# Patient Record
Sex: FEMALE | Race: BLACK OR AFRICAN AMERICAN | NOT HISPANIC OR LATINO | Employment: OTHER | URBAN - METROPOLITAN AREA
[De-identification: names, ages, dates, MRNs, and addresses within clinical notes are randomized per-mention and may not be internally consistent; named-entity substitution may affect disease eponyms.]

---

## 2017-07-25 LAB
HCV AB SER QL: NEGATIVE
HEP B SURFACE AG: NEGATIVE
HEPATITIS A IGM: NEGATIVE
HEPATITIS B CORE AB IGM: NEGATIVE

## 2019-06-27 PROBLEM — G47.00 INSOMNIA: Status: ACTIVE | Noted: 2019-06-27

## 2019-06-27 PROBLEM — D64.9 ANEMIA: Status: ACTIVE | Noted: 2019-06-27

## 2019-06-28 PROBLEM — E83.42 HYPOMAGNESEMIA: Status: ACTIVE | Noted: 2019-06-28

## 2019-06-28 PROBLEM — E53.8 VITAMIN B12 DEFICIENCY: Status: ACTIVE | Noted: 2019-06-28

## 2019-07-18 PROBLEM — F41.9 ANXIETY: Status: ACTIVE | Noted: 2019-07-18

## 2019-07-31 PROBLEM — A59.9 TRICHIMONIASIS: Status: ACTIVE | Noted: 2019-07-31

## 2019-07-31 LAB — PAP RECOMMENDATION EXT: ABNORMAL

## 2019-08-02 LAB
HPV 16: NEGATIVE
HPV 18: NEGATIVE
HPV, HR, OTHER GENOTYPES: NEGATIVE

## 2019-09-04 PROBLEM — A18.32: Status: ACTIVE | Noted: 2019-09-04

## 2019-09-04 PROBLEM — N83.201 CYST OF RIGHT OVARY: Status: ACTIVE | Noted: 2019-09-04

## 2019-09-04 PROBLEM — R10.2 PELVIC PAIN IN FEMALE: Status: ACTIVE | Noted: 2019-09-04

## 2019-09-10 PROBLEM — N20.0 KIDNEY STONE: Status: ACTIVE | Noted: 2019-09-10

## 2019-10-06 PROBLEM — N20.0 CALCIUM NEPHROLITHIASIS: Status: ACTIVE | Noted: 2017-02-07

## 2019-10-06 PROBLEM — R51.9 HEADACHE: Status: ACTIVE | Noted: 2019-10-06

## 2019-10-06 PROBLEM — R19.7 DIARRHEA FOLLOWING GASTROINTESTINAL SURGERY: Status: ACTIVE | Noted: 2017-08-07

## 2019-10-06 PROBLEM — R10.31 RIGHT LOWER QUADRANT ABDOMINAL PAIN: Status: ACTIVE | Noted: 2017-12-21

## 2019-10-06 PROBLEM — Z83.79 FAMILY HISTORY OF CROHN'S DISEASE: Status: ACTIVE | Noted: 2017-12-21

## 2019-10-06 PROBLEM — K29.00 ACUTE SUPERFICIAL GASTRITIS WITHOUT HEMORRHAGE: Status: ACTIVE | Noted: 2019-10-06

## 2019-10-06 PROBLEM — G43.009 MIGRAINE WITHOUT AURA AND WITHOUT STATUS MIGRAINOSUS, NOT INTRACTABLE: Status: ACTIVE | Noted: 2017-06-22

## 2019-10-06 PROBLEM — J30.9 ALLERGIC RHINITIS: Status: ACTIVE | Noted: 2019-10-06

## 2019-10-06 PROBLEM — Z86.11 H/O TB (TUBERCULOSIS): Status: ACTIVE | Noted: 2017-12-21

## 2019-10-06 PROBLEM — R93.3: Status: ACTIVE | Noted: 2019-10-06

## 2019-10-06 PROBLEM — Z98.890 DIARRHEA FOLLOWING GASTROINTESTINAL SURGERY: Status: ACTIVE | Noted: 2017-08-07

## 2019-10-06 PROBLEM — J01.90 ACUTE SINUSITIS: Status: ACTIVE | Noted: 2019-10-06

## 2019-10-06 PROBLEM — R10.13 ABDOMINAL PAIN, EPIGASTRIC: Status: ACTIVE | Noted: 2019-10-06

## 2019-10-06 PROBLEM — R19.7 DIARRHEA: Status: ACTIVE | Noted: 2017-12-21

## 2019-11-19 PROBLEM — N12 PYELONEPHRITIS: Status: ACTIVE | Noted: 2019-11-19

## 2019-12-13 PROBLEM — R19.7 DIARRHEA FOLLOWING GASTROINTESTINAL SURGERY: Status: RESOLVED | Noted: 2017-08-07 | Resolved: 2019-12-13

## 2019-12-13 PROBLEM — Z86.11 H/O TB (TUBERCULOSIS): Status: RESOLVED | Noted: 2017-12-21 | Resolved: 2019-12-13

## 2019-12-13 PROBLEM — R51.9 HEADACHE: Status: RESOLVED | Noted: 2019-10-06 | Resolved: 2019-12-13

## 2019-12-13 PROBLEM — Z83.79 FAMILY HISTORY OF CROHN'S DISEASE: Status: RESOLVED | Noted: 2017-12-21 | Resolved: 2019-12-13

## 2019-12-13 PROBLEM — R93.3: Status: RESOLVED | Noted: 2019-10-06 | Resolved: 2019-12-13

## 2019-12-13 PROBLEM — A18.32: Status: RESOLVED | Noted: 2019-09-04 | Resolved: 2019-12-13

## 2019-12-13 PROBLEM — N12 PYELONEPHRITIS: Status: RESOLVED | Noted: 2019-11-19 | Resolved: 2019-12-13

## 2019-12-13 PROBLEM — E83.42 HYPOMAGNESEMIA: Status: RESOLVED | Noted: 2019-06-28 | Resolved: 2019-12-13

## 2019-12-13 PROBLEM — N20.0 CALCIUM NEPHROLITHIASIS: Status: RESOLVED | Noted: 2017-02-07 | Resolved: 2019-12-13

## 2019-12-13 PROBLEM — R10.13 ABDOMINAL PAIN, EPIGASTRIC: Status: RESOLVED | Noted: 2019-10-06 | Resolved: 2019-12-13

## 2019-12-13 PROBLEM — K29.00 ACUTE SUPERFICIAL GASTRITIS WITHOUT HEMORRHAGE: Status: RESOLVED | Noted: 2019-10-06 | Resolved: 2019-12-13

## 2019-12-13 PROBLEM — R10.31 RIGHT LOWER QUADRANT ABDOMINAL PAIN: Status: RESOLVED | Noted: 2017-12-21 | Resolved: 2019-12-13

## 2019-12-13 PROBLEM — G47.00 INSOMNIA: Chronic | Status: ACTIVE | Noted: 2019-06-27

## 2019-12-13 PROBLEM — Z98.890 DIARRHEA FOLLOWING GASTROINTESTINAL SURGERY: Status: RESOLVED | Noted: 2017-08-07 | Resolved: 2019-12-13

## 2019-12-13 PROBLEM — J01.90 ACUTE SINUSITIS: Status: RESOLVED | Noted: 2019-10-06 | Resolved: 2019-12-13

## 2019-12-13 PROBLEM — R19.7 DIARRHEA: Status: RESOLVED | Noted: 2017-12-21 | Resolved: 2019-12-13

## 2019-12-13 PROBLEM — A59.9 TRICHIMONIASIS: Status: RESOLVED | Noted: 2019-07-31 | Resolved: 2019-12-13

## 2019-12-13 PROBLEM — J30.9 ALLERGIC RHINITIS: Chronic | Status: ACTIVE | Noted: 2019-10-06

## 2020-03-02 PROBLEM — R11.0 CHRONIC NAUSEA: Chronic | Status: ACTIVE | Noted: 2020-03-02

## 2020-03-02 PROBLEM — I10 ESSENTIAL HYPERTENSION: Chronic | Status: ACTIVE | Noted: 2020-03-02

## 2020-03-02 PROBLEM — T78.40XA ALLERGIES: Status: ACTIVE | Noted: 2020-03-02

## 2020-03-02 PROBLEM — M62.838 MUSCLE SPASMS OF BOTH LOWER EXTREMITIES: Chronic | Status: ACTIVE | Noted: 2020-03-02

## 2020-03-02 PROBLEM — R14.0 ABDOMINAL BLOATING: Status: ACTIVE | Noted: 2020-03-02

## 2020-03-14 PROBLEM — M54.6 THORACIC SPINE PAIN: Status: ACTIVE | Noted: 2020-03-14

## 2020-03-14 PROBLEM — K58.9 IRRITABLE BOWEL SYNDROME: Status: ACTIVE | Noted: 2020-03-14

## 2020-03-14 PROBLEM — M25.50 ARTHRALGIA: Status: ACTIVE | Noted: 2020-03-14

## 2020-06-24 PROBLEM — Z30.09 UNWANTED FERTILITY: Status: ACTIVE | Noted: 2020-06-24

## 2020-07-14 PROBLEM — L98.9 SKIN LESION: Status: ACTIVE | Noted: 2020-07-14

## 2020-08-02 PROBLEM — Z00.00 HEALTHCARE MAINTENANCE: Status: ACTIVE | Noted: 2020-08-02

## 2020-08-02 PROBLEM — L30.9 ECZEMA: Status: ACTIVE | Noted: 2020-08-02

## 2020-10-01 PROBLEM — R10.9 RIGHT FLANK PAIN: Status: ACTIVE | Noted: 2020-10-01

## 2020-11-02 PROBLEM — Z00.00 HEALTHCARE MAINTENANCE: Status: RESOLVED | Noted: 2020-08-02 | Resolved: 2020-11-02

## 2020-12-15 PROBLEM — N28.1 RENAL CYST, LEFT: Status: ACTIVE | Noted: 2020-12-15

## 2021-03-15 PROBLEM — N10 PYELONEPHRITIS, ACUTE: Status: ACTIVE | Noted: 2021-03-15

## 2021-03-30 PROBLEM — N12 RECURRENT PYELONEPHRITIS: Status: ACTIVE | Noted: 2021-03-30

## 2021-05-12 ENCOUNTER — PATIENT MESSAGE (OUTPATIENT)
Dept: RESEARCH | Facility: HOSPITAL | Age: 31
End: 2021-05-12

## 2021-12-13 PROBLEM — R13.10 DYSPHAGIA: Status: ACTIVE | Noted: 2021-12-13

## 2021-12-13 PROBLEM — R63.4 UNINTENTIONAL WEIGHT LOSS: Status: ACTIVE | Noted: 2021-12-13

## 2021-12-13 PROBLEM — R11.2 NAUSEA AND VOMITING: Status: ACTIVE | Noted: 2020-03-02

## 2021-12-13 PROBLEM — R68.81 EARLY SATIETY: Status: ACTIVE | Noted: 2021-12-13

## 2021-12-13 PROBLEM — R63.0 LOSS OF APPETITE: Status: ACTIVE | Noted: 2021-12-13

## 2022-04-11 ENCOUNTER — TELEPHONE (OUTPATIENT)
Dept: PHARMACY | Facility: CLINIC | Age: 32
End: 2022-04-11
Payer: MEDICAID

## 2022-04-12 NOTE — TELEPHONE ENCOUNTER
Pharmacist Ms. Loyd,    Thank you for reaching out and assisting with the PA process. I purposely omitted the loading dose as the patient is returning today for an in-clinic nurse visit to receive the loading dose as a sample. I thought I could not order the loading dose until the day of visit, but I may have not understood that process.     Patient is to receive loading dose today if she arrives for her nurse visit. This order is now pended until patient arrival.    Please reach out with any further questions.    Courtney Vanni, FNP-C [facility]  [phone]

## 2022-04-13 ENCOUNTER — PATIENT MESSAGE (OUTPATIENT)
Dept: ADMINISTRATIVE | Facility: OTHER | Age: 32
End: 2022-04-13
Payer: MEDICAID

## 2022-04-18 ENCOUNTER — TELEPHONE (OUTPATIENT)
Dept: PHARMACY | Facility: CLINIC | Age: 32
End: 2022-04-18
Payer: MEDICAID

## 2022-05-25 ENCOUNTER — TELEPHONE (OUTPATIENT)
Dept: PHARMACY | Facility: CLINIC | Age: 32
End: 2022-05-25
Payer: MEDICAID

## 2022-10-10 ENCOUNTER — PATIENT OUTREACH (OUTPATIENT)
Dept: ADMINISTRATIVE | Facility: HOSPITAL | Age: 32
End: 2022-10-10
Payer: MEDICAID

## 2022-12-15 ENCOUNTER — TELEPHONE (OUTPATIENT)
Dept: ADMINISTRATIVE | Facility: HOSPITAL | Age: 32
End: 2022-12-15
Payer: MEDICAID

## 2022-12-15 VITALS — DIASTOLIC BLOOD PRESSURE: 68 MMHG | SYSTOLIC BLOOD PRESSURE: 105 MMHG

## 2023-04-11 PROBLEM — G43.709 CHRONIC MIGRAINE WITHOUT AURA WITHOUT STATUS MIGRAINOSUS, NOT INTRACTABLE: Status: ACTIVE | Noted: 2017-06-22

## 2023-04-17 ENCOUNTER — PATIENT MESSAGE (OUTPATIENT)
Dept: ADMINISTRATIVE | Facility: OTHER | Age: 33
End: 2023-04-17
Payer: MEDICAID

## 2023-06-08 ENCOUNTER — TELEPHONE (OUTPATIENT)
Dept: PHARMACY | Facility: CLINIC | Age: 33
End: 2023-06-08
Payer: MEDICAID

## 2023-06-08 NOTE — TELEPHONE ENCOUNTER
Called and inform pt that The prior authorization for Marcellus Stubbs's Omeprazole prescription has been APPROVED FROM 6/6/2023 TO 6/5/2024 with copayment of $0.00.

## 2024-04-28 PROBLEM — K80.50 COLIC, BILIARY: Status: ACTIVE | Noted: 2024-04-28

## 2024-04-28 PROBLEM — E87.6 HYPOKALEMIA: Status: ACTIVE | Noted: 2024-04-28

## 2024-04-28 PROBLEM — B95.61 STAPHYLOCOCCUS AUREUS BACTEREMIA: Status: ACTIVE | Noted: 2024-04-28

## 2024-04-28 PROBLEM — R78.81 STAPHYLOCOCCUS AUREUS BACTEREMIA: Status: ACTIVE | Noted: 2024-04-28

## 2024-04-28 PROBLEM — R00.0 TACHYCARDIA: Status: ACTIVE | Noted: 2024-04-28

## 2024-04-29 PROBLEM — B95.7 COAGULASE NEGATIVE STAPHYLOCOCCUS BACTEREMIA: Status: ACTIVE | Noted: 2024-04-28

## 2024-04-30 PROBLEM — R50.9 PERSISTENT FEVER: Status: ACTIVE | Noted: 2024-04-30

## 2024-05-05 PROBLEM — K83.8 DILATED BILE DUCT: Status: ACTIVE | Noted: 2024-05-05

## 2024-05-09 PROBLEM — D75.839 THROMBOCYTOSIS: Status: ACTIVE | Noted: 2024-05-09

## 2024-05-20 ENCOUNTER — PATIENT MESSAGE (OUTPATIENT)
Dept: ADMINISTRATIVE | Facility: HOSPITAL | Age: 34
End: 2024-05-20
Payer: MEDICAID

## 2024-07-29 PROBLEM — N12 PYELONEPHRITIS OF LEFT KIDNEY: Status: RESOLVED | Noted: 2021-03-30 | Resolved: 2024-07-29

## 2024-10-29 PROBLEM — M25.531 RIGHT WRIST PAIN: Status: ACTIVE | Noted: 2024-10-29

## 2024-11-18 ENCOUNTER — PATIENT MESSAGE (OUTPATIENT)
Dept: ADMINISTRATIVE | Facility: HOSPITAL | Age: 34
End: 2024-11-18
Payer: MEDICAID

## 2024-11-18 PROBLEM — K31.89 RETAINED FOOD IN STOMACH: Status: ACTIVE | Noted: 2024-11-18

## 2024-11-18 PROBLEM — K25.9 GASTRIC ULCER WITHOUT HEMORRHAGE OR PERFORATION: Status: ACTIVE | Noted: 2024-11-18

## 2025-01-27 PROBLEM — R20.2 RIGHT HAND PARESTHESIA: Status: ACTIVE | Noted: 2025-01-27

## 2025-02-11 PROBLEM — M25.50 ARTHRALGIA: Status: RESOLVED | Noted: 2020-03-14 | Resolved: 2025-02-11

## 2025-02-11 PROBLEM — D75.839 THROMBOCYTOSIS: Status: RESOLVED | Noted: 2024-05-09 | Resolved: 2025-02-11

## 2025-02-11 PROBLEM — M54.6 THORACIC SPINE PAIN: Status: RESOLVED | Noted: 2020-03-14 | Resolved: 2025-02-11

## 2025-02-11 PROBLEM — Z30.09 UNWANTED FERTILITY: Status: RESOLVED | Noted: 2020-06-24 | Resolved: 2025-02-11

## 2025-02-11 PROBLEM — L98.9 SKIN LESION: Status: RESOLVED | Noted: 2020-07-14 | Resolved: 2025-02-11

## 2025-02-11 PROBLEM — E83.42 HYPOMAGNESEMIA: Status: RESOLVED | Noted: 2019-06-28 | Resolved: 2025-02-11

## 2025-02-11 PROBLEM — D64.9 ANEMIA: Status: RESOLVED | Noted: 2019-06-27 | Resolved: 2025-02-11

## 2025-02-11 PROBLEM — R10.2 PELVIC PAIN IN FEMALE: Status: RESOLVED | Noted: 2019-09-04 | Resolved: 2025-02-11

## 2025-02-11 PROBLEM — K83.8 INTRAHEPATIC BILE DUCT DILATION: Status: RESOLVED | Noted: 2024-05-05 | Resolved: 2025-02-11

## 2025-02-11 PROBLEM — R63.0 LOSS OF APPETITE: Status: RESOLVED | Noted: 2021-12-13 | Resolved: 2025-02-11

## 2025-02-11 PROBLEM — R20.2 RIGHT HAND PARESTHESIA: Status: RESOLVED | Noted: 2025-01-27 | Resolved: 2025-02-11

## 2025-02-11 PROBLEM — N10 PYELONEPHRITIS, ACUTE: Status: RESOLVED | Noted: 2021-03-15 | Resolved: 2025-02-11

## 2025-02-11 PROBLEM — R93.1 ABNORMAL ECHOCARDIOGRAM: Status: ACTIVE | Noted: 2025-02-11

## 2025-02-11 PROBLEM — R11.2 NAUSEA AND VOMITING: Status: RESOLVED | Noted: 2020-03-02 | Resolved: 2025-02-11

## 2025-02-11 PROBLEM — R10.13 DYSPEPSIA: Status: RESOLVED | Noted: 2019-10-06 | Resolved: 2025-02-11

## 2025-02-11 PROBLEM — E87.6 HYPOKALEMIA: Status: RESOLVED | Noted: 2024-04-28 | Resolved: 2025-02-11

## 2025-02-11 PROBLEM — R00.0 TACHYCARDIA: Status: RESOLVED | Noted: 2024-04-28 | Resolved: 2025-02-11

## 2025-02-11 PROBLEM — M25.531 RIGHT WRIST PAIN: Status: RESOLVED | Noted: 2024-10-29 | Resolved: 2025-02-11

## 2025-02-11 PROBLEM — R78.81 COAGULASE NEGATIVE STAPHYLOCOCCUS BACTEREMIA: Status: RESOLVED | Noted: 2024-04-28 | Resolved: 2025-02-11

## 2025-02-11 PROBLEM — T78.40XA ALLERGIES: Status: RESOLVED | Noted: 2020-03-02 | Resolved: 2025-02-11

## 2025-02-11 PROBLEM — R10.9 ABDOMINAL PAIN: Status: RESOLVED | Noted: 2020-10-01 | Resolved: 2025-02-11

## 2025-02-11 PROBLEM — R14.0 ABDOMINAL BLOATING: Status: RESOLVED | Noted: 2020-03-02 | Resolved: 2025-02-11

## 2025-02-11 PROBLEM — B95.7 COAGULASE NEGATIVE STAPHYLOCOCCUS BACTEREMIA: Status: RESOLVED | Noted: 2024-04-28 | Resolved: 2025-02-11

## 2025-02-11 PROBLEM — K31.89 RETAINED FOOD IN STOMACH: Status: RESOLVED | Noted: 2024-11-18 | Resolved: 2025-02-11

## 2025-02-11 PROBLEM — M62.838 MUSCLE SPASMS OF BOTH LOWER EXTREMITIES: Chronic | Status: RESOLVED | Noted: 2020-03-02 | Resolved: 2025-02-11

## 2025-02-11 PROBLEM — R13.10 DYSPHAGIA: Status: RESOLVED | Noted: 2021-12-13 | Resolved: 2025-02-11

## 2025-02-17 ENCOUNTER — PATIENT MESSAGE (OUTPATIENT)
Dept: ADMINISTRATIVE | Facility: HOSPITAL | Age: 35
End: 2025-02-17
Payer: MEDICAID

## 2025-02-17 ENCOUNTER — PATIENT MESSAGE (OUTPATIENT)
Dept: ADMINISTRATIVE | Facility: OTHER | Age: 35
End: 2025-02-17
Payer: MEDICAID

## 2025-02-18 ENCOUNTER — PATIENT OUTREACH (OUTPATIENT)
Dept: ADMINISTRATIVE | Facility: HOSPITAL | Age: 35
End: 2025-02-18
Payer: MEDICAID

## 2025-02-28 PROBLEM — E87.6 HYPOKALEMIA: Status: ACTIVE | Noted: 2025-02-28

## 2025-02-28 PROBLEM — R57.9 SHOCK: Status: ACTIVE | Noted: 2025-02-28

## 2025-02-28 PROBLEM — E87.1 HYPONATREMIA: Status: ACTIVE | Noted: 2025-02-28

## 2025-02-28 PROBLEM — E83.51 HYPOCALCEMIA: Status: ACTIVE | Noted: 2025-02-28

## 2025-02-28 PROBLEM — E83.42 HYPOMAGNESEMIA: Status: ACTIVE | Noted: 2025-02-28

## 2025-02-28 PROBLEM — D64.9 ANEMIA: Status: ACTIVE | Noted: 2025-02-28

## 2025-02-28 PROBLEM — Z90.721 S/P RIGHT OOPHORECTOMY: Status: ACTIVE | Noted: 2025-02-28

## 2025-02-28 PROBLEM — E83.39 HYPOPHOSPHATEMIA: Status: ACTIVE | Noted: 2025-02-28

## 2025-02-28 PROBLEM — E87.20 ACIDOSIS: Status: ACTIVE | Noted: 2025-02-28

## 2025-02-28 PROBLEM — N83.519 OVARIAN TORSION: Status: ACTIVE | Noted: 2025-02-28

## 2025-02-28 PROBLEM — G89.18 POST-OP PAIN: Status: ACTIVE | Noted: 2025-02-28

## 2025-03-02 PROBLEM — N83.511 TORSION OF RIGHT OVARY AND OVARIAN PEDICLE: Status: ACTIVE | Noted: 2025-02-28

## 2025-03-02 PROBLEM — I95.2 HYPOTENSION DUE TO DRUGS: Status: ACTIVE | Noted: 2025-03-02

## 2025-03-05 PROBLEM — E87.1 HYPONATREMIA: Status: RESOLVED | Noted: 2025-02-28 | Resolved: 2025-03-05

## 2025-03-05 PROBLEM — E87.6 HYPOKALEMIA: Status: RESOLVED | Noted: 2025-02-28 | Resolved: 2025-03-05

## 2025-03-05 PROBLEM — E87.20 ACIDOSIS: Status: RESOLVED | Noted: 2025-02-28 | Resolved: 2025-03-05

## 2025-03-05 PROBLEM — R50.9 PERSISTENT FEVER: Status: RESOLVED | Noted: 2024-04-30 | Resolved: 2025-03-05

## 2025-03-05 PROBLEM — N83.511 TORSION OF RIGHT OVARY AND OVARIAN PEDICLE: Status: RESOLVED | Noted: 2025-02-28 | Resolved: 2025-03-05

## 2025-03-05 PROBLEM — I95.2 HYPOTENSION DUE TO DRUGS: Status: RESOLVED | Noted: 2025-03-02 | Resolved: 2025-03-05

## 2025-03-05 PROBLEM — N83.201 CYST OF RIGHT OVARY: Status: RESOLVED | Noted: 2019-09-04 | Resolved: 2025-03-05

## 2025-03-05 PROBLEM — E83.51 HYPOCALCEMIA: Status: RESOLVED | Noted: 2025-02-28 | Resolved: 2025-03-05

## 2025-03-05 PROBLEM — E83.39 HYPOPHOSPHATEMIA: Status: RESOLVED | Noted: 2025-02-28 | Resolved: 2025-03-05

## 2025-03-05 PROBLEM — K91.30: Status: ACTIVE | Noted: 2025-03-05

## 2025-03-05 PROBLEM — E83.42 HYPOMAGNESEMIA: Status: RESOLVED | Noted: 2025-02-28 | Resolved: 2025-03-05

## 2025-03-05 PROBLEM — R57.9 SHOCK: Status: RESOLVED | Noted: 2025-02-28 | Resolved: 2025-03-05

## 2025-03-06 PROBLEM — K56.7 ILEUS FOLLOWING GASTROINTESTINAL SURGERY: Status: ACTIVE | Noted: 2025-03-06

## 2025-03-06 PROBLEM — E87.6 HYPOKALEMIA: Status: ACTIVE | Noted: 2025-03-06

## 2025-03-06 PROBLEM — K91.89 ILEUS FOLLOWING GASTROINTESTINAL SURGERY: Status: ACTIVE | Noted: 2025-03-06

## 2025-03-06 PROBLEM — E87.1 HYPONATREMIA: Status: ACTIVE | Noted: 2025-03-06

## 2025-03-07 PROBLEM — R50.9 FEVER, UNKNOWN ORIGIN: Status: ACTIVE | Noted: 2025-03-07

## 2025-03-08 PROBLEM — K56.690 OTHER PARTIAL INTESTINAL OBSTRUCTION: Status: ACTIVE | Noted: 2025-03-08
